# Patient Record
Sex: FEMALE | Race: ASIAN | URBAN - METROPOLITAN AREA
[De-identification: names, ages, dates, MRNs, and addresses within clinical notes are randomized per-mention and may not be internally consistent; named-entity substitution may affect disease eponyms.]

---

## 2016-04-20 RX ORDER — ONDANSETRON 8 MG/1
1 TABLET, FILM COATED ORAL
Qty: 15 | Refills: 0 | OUTPATIENT
Start: 2016-04-20 | End: 2017-08-28

## 2016-08-01 RX ORDER — OMEPRAZOLE 10 MG/1
1 CAPSULE, DELAYED RELEASE ORAL
Qty: 30 | Refills: 1 | OUTPATIENT
Start: 2016-08-01 | End: 2017-10-21

## 2017-08-18 ENCOUNTER — INPATIENT (INPATIENT)
Facility: HOSPITAL | Age: 22
LOS: 1 days | Discharge: HOME | DRG: 340 | End: 2017-08-20
Attending: HOSPITALIST | Admitting: HOSPITALIST
Payer: MEDICAID

## 2017-08-18 VITALS
WEIGHT: 260.15 LBS | OXYGEN SATURATION: 98 % | HEART RATE: 92 BPM | RESPIRATION RATE: 16 BRPM | HEIGHT: 62 IN | DIASTOLIC BLOOD PRESSURE: 88 MMHG | TEMPERATURE: 98 F | SYSTOLIC BLOOD PRESSURE: 125 MMHG

## 2017-08-18 DIAGNOSIS — Z29.9 ENCOUNTER FOR PROPHYLACTIC MEASURES, UNSPECIFIED: ICD-10-CM

## 2017-08-18 DIAGNOSIS — N83.20 UNSPECIFIED OVARIAN CYSTS: ICD-10-CM

## 2017-08-18 DIAGNOSIS — K29.70 GASTRITIS, UNSPECIFIED, WITHOUT BLEEDING: ICD-10-CM

## 2017-08-18 LAB
ALBUMIN SERPL ELPH-MCNC: 3.7 G/DL — SIGNIFICANT CHANGE UP (ref 3.5–5)
ALP SERPL-CCNC: 57 U/L — SIGNIFICANT CHANGE UP (ref 40–120)
ALT FLD-CCNC: 16 U/L DA — SIGNIFICANT CHANGE UP (ref 10–60)
ANION GAP SERPL CALC-SCNC: 9 MMOL/L — SIGNIFICANT CHANGE UP (ref 5–17)
AST SERPL-CCNC: 12 U/L — SIGNIFICANT CHANGE UP (ref 10–40)
BASOPHILS # BLD AUTO: 0 K/UL — SIGNIFICANT CHANGE UP (ref 0–0.2)
BASOPHILS NFR BLD AUTO: 0.4 % — SIGNIFICANT CHANGE UP (ref 0–2)
BILIRUB SERPL-MCNC: 0.7 MG/DL — SIGNIFICANT CHANGE UP (ref 0.2–1.2)
BUN SERPL-MCNC: 10 MG/DL — SIGNIFICANT CHANGE UP (ref 7–18)
CALCIUM SERPL-MCNC: 8.6 MG/DL — SIGNIFICANT CHANGE UP (ref 8.4–10.5)
CHLORIDE SERPL-SCNC: 107 MMOL/L — SIGNIFICANT CHANGE UP (ref 96–108)
CO2 SERPL-SCNC: 26 MMOL/L — SIGNIFICANT CHANGE UP (ref 22–31)
CREAT SERPL-MCNC: 0.72 MG/DL — SIGNIFICANT CHANGE UP (ref 0.5–1.3)
EOSINOPHIL # BLD AUTO: 0 K/UL — SIGNIFICANT CHANGE UP (ref 0–0.5)
EOSINOPHIL NFR BLD AUTO: 0.1 % — SIGNIFICANT CHANGE UP (ref 0–6)
GLUCOSE SERPL-MCNC: 104 MG/DL — HIGH (ref 70–99)
HCG UR QL: NEGATIVE — SIGNIFICANT CHANGE UP
HCT VFR BLD CALC: 40.1 % — SIGNIFICANT CHANGE UP (ref 34.5–45)
HGB BLD-MCNC: 13.1 G/DL — SIGNIFICANT CHANGE UP (ref 11.5–15.5)
LIDOCAIN IGE QN: 135 U/L — SIGNIFICANT CHANGE UP (ref 73–393)
LYMPHOCYTES # BLD AUTO: 0.8 K/UL — LOW (ref 1–3.3)
LYMPHOCYTES # BLD AUTO: 8.1 % — LOW (ref 13–44)
MCHC RBC-ENTMCNC: 29 PG — SIGNIFICANT CHANGE UP (ref 27–34)
MCHC RBC-ENTMCNC: 32.7 GM/DL — SIGNIFICANT CHANGE UP (ref 32–36)
MCV RBC AUTO: 88.6 FL — SIGNIFICANT CHANGE UP (ref 80–100)
MONOCYTES # BLD AUTO: 0.3 K/UL — SIGNIFICANT CHANGE UP (ref 0–0.9)
MONOCYTES NFR BLD AUTO: 2.9 % — SIGNIFICANT CHANGE UP (ref 2–14)
NEUTROPHILS # BLD AUTO: 8.4 K/UL — HIGH (ref 1.8–7.4)
NEUTROPHILS NFR BLD AUTO: 88.6 % — HIGH (ref 43–77)
PLATELET # BLD AUTO: 174 K/UL — SIGNIFICANT CHANGE UP (ref 150–400)
POTASSIUM SERPL-MCNC: 3.5 MMOL/L — SIGNIFICANT CHANGE UP (ref 3.5–5.3)
POTASSIUM SERPL-SCNC: 3.5 MMOL/L — SIGNIFICANT CHANGE UP (ref 3.5–5.3)
PROT SERPL-MCNC: 7.7 G/DL — SIGNIFICANT CHANGE UP (ref 6–8.3)
RBC # BLD: 4.52 M/UL — SIGNIFICANT CHANGE UP (ref 3.8–5.2)
RBC # FLD: 13.1 % — SIGNIFICANT CHANGE UP (ref 10.3–14.5)
SODIUM SERPL-SCNC: 142 MMOL/L — SIGNIFICANT CHANGE UP (ref 135–145)
WBC # BLD: 9.5 K/UL — SIGNIFICANT CHANGE UP (ref 3.8–10.5)
WBC # FLD AUTO: 9.5 K/UL — SIGNIFICANT CHANGE UP (ref 3.8–10.5)

## 2017-08-18 PROCEDURE — 76705 ECHO EXAM OF ABDOMEN: CPT | Mod: 26

## 2017-08-18 PROCEDURE — 99285 EMERGENCY DEPT VISIT HI MDM: CPT

## 2017-08-18 RX ORDER — SODIUM CHLORIDE 9 MG/ML
1000 INJECTION INTRAMUSCULAR; INTRAVENOUS; SUBCUTANEOUS ONCE
Qty: 0 | Refills: 0 | Status: COMPLETED | OUTPATIENT
Start: 2017-08-18 | End: 2017-08-18

## 2017-08-18 RX ORDER — ONDANSETRON 8 MG/1
4 TABLET, FILM COATED ORAL EVERY 4 HOURS
Qty: 0 | Refills: 0 | Status: DISCONTINUED | OUTPATIENT
Start: 2017-08-18 | End: 2017-08-19

## 2017-08-18 RX ORDER — FAMOTIDINE 10 MG/ML
20 INJECTION INTRAVENOUS ONCE
Qty: 0 | Refills: 0 | Status: COMPLETED | OUTPATIENT
Start: 2017-08-18 | End: 2017-08-18

## 2017-08-18 RX ORDER — PANTOPRAZOLE SODIUM 20 MG/1
40 TABLET, DELAYED RELEASE ORAL
Qty: 0 | Refills: 0 | Status: DISCONTINUED | OUTPATIENT
Start: 2017-08-18 | End: 2017-08-19

## 2017-08-18 RX ORDER — ACETAMINOPHEN 500 MG
650 TABLET ORAL ONCE
Qty: 0 | Refills: 0 | Status: COMPLETED | OUTPATIENT
Start: 2017-08-18 | End: 2017-08-18

## 2017-08-18 RX ORDER — SUCRALFATE 1 G
1 TABLET ORAL ONCE
Qty: 0 | Refills: 0 | Status: COMPLETED | OUTPATIENT
Start: 2017-08-18 | End: 2017-08-18

## 2017-08-18 RX ORDER — PANTOPRAZOLE SODIUM 20 MG/1
40 TABLET, DELAYED RELEASE ORAL ONCE
Qty: 0 | Refills: 0 | Status: COMPLETED | OUTPATIENT
Start: 2017-08-18 | End: 2017-08-18

## 2017-08-18 RX ORDER — ONDANSETRON 8 MG/1
4 TABLET, FILM COATED ORAL ONCE
Qty: 0 | Refills: 0 | Status: COMPLETED | OUTPATIENT
Start: 2017-08-18 | End: 2017-08-18

## 2017-08-18 RX ORDER — METOCLOPRAMIDE HCL 10 MG
10 TABLET ORAL ONCE
Qty: 0 | Refills: 0 | Status: COMPLETED | OUTPATIENT
Start: 2017-08-18 | End: 2017-08-18

## 2017-08-18 RX ORDER — SODIUM CHLORIDE 9 MG/ML
1000 INJECTION INTRAMUSCULAR; INTRAVENOUS; SUBCUTANEOUS
Qty: 0 | Refills: 0 | Status: DISCONTINUED | OUTPATIENT
Start: 2017-08-18 | End: 2017-08-19

## 2017-08-18 RX ADMIN — Medication 650 MILLIGRAM(S): at 08:30

## 2017-08-18 RX ADMIN — PANTOPRAZOLE SODIUM 40 MILLIGRAM(S): 20 TABLET, DELAYED RELEASE ORAL at 09:41

## 2017-08-18 RX ADMIN — Medication 10 MILLIGRAM(S): at 09:41

## 2017-08-18 RX ADMIN — SODIUM CHLORIDE 150 MILLILITER(S): 9 INJECTION INTRAMUSCULAR; INTRAVENOUS; SUBCUTANEOUS at 22:49

## 2017-08-18 RX ADMIN — ONDANSETRON 4 MILLIGRAM(S): 8 TABLET, FILM COATED ORAL at 08:52

## 2017-08-18 RX ADMIN — Medication 30 MILLILITER(S): at 08:51

## 2017-08-18 RX ADMIN — SODIUM CHLORIDE 2000 MILLILITER(S): 9 INJECTION INTRAMUSCULAR; INTRAVENOUS; SUBCUTANEOUS at 08:28

## 2017-08-18 RX ADMIN — FAMOTIDINE 20 MILLIGRAM(S): 10 INJECTION INTRAVENOUS at 08:51

## 2017-08-18 RX ADMIN — Medication 650 MILLIGRAM(S): at 08:51

## 2017-08-18 RX ADMIN — ONDANSETRON 4 MILLIGRAM(S): 8 TABLET, FILM COATED ORAL at 09:41

## 2017-08-18 RX ADMIN — Medication 204 MILLIGRAM(S): at 11:22

## 2017-08-18 RX ADMIN — Medication 1 GRAM(S): at 10:10

## 2017-08-18 RX ADMIN — ONDANSETRON 4 MILLIGRAM(S): 8 TABLET, FILM COATED ORAL at 13:50

## 2017-08-18 NOTE — H&P ADULT - MUSCULOSKELETAL
detailed exam normal strength/no joint erythema/no calf tenderness/ROM intact/no joint swelling/no joint warmth/normal

## 2017-08-18 NOTE — ED PROVIDER NOTE - MEDICAL DECISION MAKING DETAILS
21 y/o F pt w/ likely recurrent gastritis. Will treat for gastritis, check labs, likely outpatient f/u.

## 2017-08-18 NOTE — ED PROVIDER NOTE - OBJECTIVE STATEMENT
23 y/o F pt w/ PMHx of gastritis and PUD (treated w/ resolution of symptoms 1 year ago) presents to ED c/o recurrence of symptoms since yesterday. Pt reports similar burning epigastric pain radiating up into her chest. Pt states that she took Tylenol without relief. Pt reports that progressive worsening of symptoms yesterday until earlier this morning when she began having nausea and vomiting x2 (NBNB). Pt denies fever, chills, chest pain, cough, SOB, diarrhea, dysuria, hematuria, or any other complaints. Pt reports no recent illness. Pt is currently taking no medications. Pt is allergic to Penicillin (Anaphylaxis).

## 2017-08-18 NOTE — H&P ADULT - ASSESSMENT
In the ED vitals signs stable, labs good, U/s shows no cholecystitis , cholelithiasis, got 1 L NS bolus, Tylenol Maalox Pepcid raglan Zofran Protonix promethazine sucralfate, CT abdomen ordered. Admitted to medicine floor for likely gastritis.

## 2017-08-18 NOTE — H&P ADULT - HISTORY OF PRESENT ILLNESS
23 Y/O F, PMhx of gastritis and PUD in last year ( treated with meds), lives with family, came for many episodes of vomiting nausea, heart burn, abdominal pain. As per the patient she started having vomiting today at about 4 :00 am. Initially vomiting was comtaing food particles, later it was watery with green colour, no blood. Uptio now she has many episodes of vomiting. Associated she has cramping periumbilical abdominal pain, 5/10. She aslo endorses having the loose stools about 3 episodes yesterday she ate at [employer] , but it is ot new for her. She has similar kind of episode about last year in Feb, seen by gastroenterologist Dr Valentine , given the treatment with some meds for 10 days, diagnosed with ulcer, then she used the omeprazole for about 3 months wit resolution in her symptoms. No f/up afterwards. She denies any headache weight loss, urinary complains, blood in stool,  no recent use of antibiotics. 21 Y/O F, PMhx of gastritis and PUD in last year ( treated with meds), lives with family, came for many episodes of vomiting nausea, heart burn, abdominal pain. As per the patient she started having vomiting today at about 4 :00 am. Initially vomiting was comtaing food particles, later it was watery with green colour, no blood. Upto now she has many episodes of vomiting. Associated she has cramping periumbilical abdominal pain, 5/10. She aslo endorses having the loose stools about 3 episodes yesterday she ate at [employer] , but it is ot new for her. She has similar kind of episode about last year in Feb, seen by gastroenterologist Dr Valentine , given the treatment with some meds for 10 days, diagnosed with ulcer, then she used the omeprazole for about 3 months wit resolution in her symptoms. No f/up afterwards. She denies any headache weight loss, urinary complains, blood in stool,  no recent use of antibiotics.     Last LMP this august, she currently having her periods 21 Y/O F, PMhx of gastritis and PUD in last year ( treated with meds), lives with family, came for many episodes of vomiting nausea, heart burn, abdominal pain. As per the patient she started having vomiting today at about 4 :00 am. Initially vomiting was containing food particles, later it was watery with green colour, no blood. Upto now she has many episodes of vomiting. Associated she has cramping periumbilical abdominal pain, 5/10. She aslo endorses having the loose stools about 3 episodes yesterday she ate at [employer] , but it is not new for her. She has similar kind of episode about last year in Feb, seen by gastroenterologist Dr Valentine , given the treatment with some meds for 10 days, diagnosed with ulcer, then she used the omeprazole for about 3 months wit resolution in her symptoms. No f/up afterwards. She denies any headache weight loss, urinary complains, blood in stool,  no recent use of antibiotics.     Last LMP this august, she currently having her periods

## 2017-08-18 NOTE — H&P ADULT - PROBLEM SELECTOR PLAN 3
Imporve score is 0, no need for DVT prophylaxis, encourage the ambulation. Improve score is 0, no need for DVT prophylaxis, encourage the ambulation.

## 2017-08-18 NOTE — ED PROVIDER NOTE - PROGRESS NOTE DETAILS
Persistent N/V x4 episodes despite multiple anti-emetics, will admit for IV hydration and further eval.

## 2017-08-18 NOTE — ED ADULT NURSE NOTE - OBJECTIVE STATEMENT
Patient is here for stomach pain since yesterday.  Patient states that she vomited in the morning with very bed pain.  Denied vomiting at the time but pain 10/10.  Maintained calm behavior at the bedside with family member.

## 2017-08-18 NOTE — H&P ADULT - NSHPSOCIALHISTORY_GEN_ALL_CORE
Non-smoker, occasional alcohol intake, no substance absue Non-smoker, occasional alcohol intake, no substance abuse

## 2017-08-18 NOTE — H&P ADULT - ATTENDING COMMENTS
Patient seen and examined at bedside yesterday, CT abdomen was to be done to rule out appendicitis as symptoms coinciding with.  Patient had pete-umbilical pain associated with nausea and vomiting.  Physical exam: pain at the right lower quadrant.  A/P  CT abdomen to rule out appendicitis  antiemetics

## 2017-08-18 NOTE — H&P ADULT - PROBLEM SELECTOR PLAN 1
Likely gastritis, given previous history of ulcer as  per family   s/p  Maalox Pepcid raglan Zofran Protonix promethazine sucralfate  No alarm findings   Also has pete umblical tenderness   Will do the CT scan   Gentle hydration for 24 hours, clears liquid diet   C/w Protonix BID, Zofran  If symptoms persistent needs EGD   GI consult Likely gastritis, given previous history of ulcer as per family   s/p  Maalox Pepcid raglan Zofran Protonix promethazine sucralfate  No alarm findings   Also has pete umbilical tenderness   Will do the CT scan of abdomen and pelvis   Gentle hydration for 24 hours, clears liquid diet   C/w Protonix BID, Zofran, maalox   If symptoms persistent needs EGD   GI consult Likely gastritis, given previous history of ulcer as per family   s/p  Maalox Pepcid raglan Zofran Protonix promethazine sucralfate  No alarm findings   Also has pete umbilical tenderness   Will do the CT scan of abdomen and pelvis   Gentle hydration for 24 hours, clears liquid diet   C/w Protonix BID, Zofran, maalox   If symptoms persistent needs EGD

## 2017-08-18 NOTE — ED ADULT TRIAGE NOTE - CHIEF COMPLAINT QUOTE
as per the pt " I have the epigastric pain from yesterday and vomiting " pt has the h/o peptic ulcer disease

## 2017-08-18 NOTE — H&P ADULT - NSHPLABSRESULTS_GEN_ALL_CORE
13.1   9.5   )-----------( 174      ( 18 Aug 2017 08:42 )             40.1   08-18    142  |  107  |  10  ----------------------------<  104<H>  3.5   |  26  |  0.72    Ca    8.6      18 Aug 2017 08:42    TPro  7.7  /  Alb  3.7  /  TBili  0.7  /  DBili  x   /  AST  12  /  ALT  16  /  AlkPhos  57  08-18 13.1   9.5   )-----------( 174      ( 18 Aug 2017 08:42 )             40.1   08-18    142  |  107  |  10  ----------------------------<  104<H>  3.5   |  26  |  0.72    Ca    8.6      18 Aug 2017 08:42    TPro  7.7  /  Alb  3.7  /  TBili  0.7  /  DBili  x   /  AST  12  /  ALT  16  /  AlkPhos  57  08-18     US Hepatic & Pancreatic (08.18.17 @ 13:31) >  No evidence for cholelithiasis or acute cholecystitis.

## 2017-08-19 ENCOUNTER — RESULT REVIEW (OUTPATIENT)
Age: 22
End: 2017-08-19

## 2017-08-19 DIAGNOSIS — K35.80 UNSPECIFIED ACUTE APPENDICITIS: ICD-10-CM

## 2017-08-19 DIAGNOSIS — K29.00 ACUTE GASTRITIS WITHOUT BLEEDING: ICD-10-CM

## 2017-08-19 LAB
ALBUMIN SERPL ELPH-MCNC: 3.2 G/DL — LOW (ref 3.5–5)
ALP SERPL-CCNC: 50 U/L — SIGNIFICANT CHANGE UP (ref 40–120)
ALT FLD-CCNC: 14 U/L DA — SIGNIFICANT CHANGE UP (ref 10–60)
ANION GAP SERPL CALC-SCNC: 8 MMOL/L — SIGNIFICANT CHANGE UP (ref 5–17)
AST SERPL-CCNC: 12 U/L — SIGNIFICANT CHANGE UP (ref 10–40)
BASOPHILS # BLD AUTO: 0 K/UL — SIGNIFICANT CHANGE UP (ref 0–0.2)
BASOPHILS NFR BLD AUTO: 0.2 % — SIGNIFICANT CHANGE UP (ref 0–2)
BILIRUB SERPL-MCNC: 1.3 MG/DL — HIGH (ref 0.2–1.2)
BUN SERPL-MCNC: 6 MG/DL — LOW (ref 7–18)
CALCIUM SERPL-MCNC: 8.3 MG/DL — LOW (ref 8.4–10.5)
CHLORIDE SERPL-SCNC: 108 MMOL/L — SIGNIFICANT CHANGE UP (ref 96–108)
CHOLEST SERPL-MCNC: 164 MG/DL — SIGNIFICANT CHANGE UP (ref 10–199)
CO2 SERPL-SCNC: 25 MMOL/L — SIGNIFICANT CHANGE UP (ref 22–31)
CREAT SERPL-MCNC: 0.65 MG/DL — SIGNIFICANT CHANGE UP (ref 0.5–1.3)
EOSINOPHIL # BLD AUTO: 0 K/UL — SIGNIFICANT CHANGE UP (ref 0–0.5)
EOSINOPHIL NFR BLD AUTO: 0.1 % — SIGNIFICANT CHANGE UP (ref 0–6)
FOLATE SERPL-MCNC: 6.2 NG/ML — SIGNIFICANT CHANGE UP (ref 4.8–24.2)
GLUCOSE SERPL-MCNC: 85 MG/DL — SIGNIFICANT CHANGE UP (ref 70–99)
HBA1C BLD-MCNC: 5 % — SIGNIFICANT CHANGE UP (ref 4–5.6)
HCT VFR BLD CALC: 35.8 % — SIGNIFICANT CHANGE UP (ref 34.5–45)
HDLC SERPL-MCNC: 51 MG/DL — SIGNIFICANT CHANGE UP (ref 40–125)
HGB BLD-MCNC: 11.8 G/DL — SIGNIFICANT CHANGE UP (ref 11.5–15.5)
INR BLD: 1.32 RATIO — HIGH (ref 0.88–1.16)
LIPID PNL WITH DIRECT LDL SERPL: 99 MG/DL — SIGNIFICANT CHANGE UP
LYMPHOCYTES # BLD AUTO: 0.5 K/UL — LOW (ref 1–3.3)
LYMPHOCYTES # BLD AUTO: 4.6 % — LOW (ref 13–44)
MAGNESIUM SERPL-MCNC: 1.9 MG/DL — SIGNIFICANT CHANGE UP (ref 1.6–2.6)
MCHC RBC-ENTMCNC: 29.2 PG — SIGNIFICANT CHANGE UP (ref 27–34)
MCHC RBC-ENTMCNC: 33 GM/DL — SIGNIFICANT CHANGE UP (ref 32–36)
MCV RBC AUTO: 88.5 FL — SIGNIFICANT CHANGE UP (ref 80–100)
MONOCYTES # BLD AUTO: 0.1 K/UL — SIGNIFICANT CHANGE UP (ref 0–0.9)
MONOCYTES NFR BLD AUTO: 0.7 % — LOW (ref 2–14)
NEUTROPHILS # BLD AUTO: 9.8 K/UL — HIGH (ref 1.8–7.4)
NEUTROPHILS NFR BLD AUTO: 94.4 % — HIGH (ref 43–77)
PHOSPHATE SERPL-MCNC: 2.5 MG/DL — SIGNIFICANT CHANGE UP (ref 2.5–4.5)
PLATELET # BLD AUTO: 160 K/UL — SIGNIFICANT CHANGE UP (ref 150–400)
POTASSIUM SERPL-MCNC: 3.3 MMOL/L — LOW (ref 3.5–5.3)
POTASSIUM SERPL-SCNC: 3.3 MMOL/L — LOW (ref 3.5–5.3)
PROT SERPL-MCNC: 6.7 G/DL — SIGNIFICANT CHANGE UP (ref 6–8.3)
PROTHROM AB SERPL-ACNC: 14.5 SEC — HIGH (ref 9.8–12.7)
RBC # BLD: 4.05 M/UL — SIGNIFICANT CHANGE UP (ref 3.8–5.2)
RBC # FLD: 12.9 % — SIGNIFICANT CHANGE UP (ref 10.3–14.5)
SODIUM SERPL-SCNC: 141 MMOL/L — SIGNIFICANT CHANGE UP (ref 135–145)
TOTAL CHOLESTEROL/HDL RATIO MEASUREMENT: 3.2 RATIO — LOW (ref 3.3–7.1)
TRIGL SERPL-MCNC: 71 MG/DL — SIGNIFICANT CHANGE UP (ref 10–149)
TSH SERPL-MCNC: 0.46 UU/ML — SIGNIFICANT CHANGE UP (ref 0.34–4.82)
VIT B12 SERPL-MCNC: 559 PG/ML — SIGNIFICANT CHANGE UP (ref 243–894)
WBC # BLD: 10.4 K/UL — SIGNIFICANT CHANGE UP (ref 3.8–10.5)
WBC # FLD AUTO: 10.4 K/UL — SIGNIFICANT CHANGE UP (ref 3.8–10.5)

## 2017-08-19 PROCEDURE — 88304 TISSUE EXAM BY PATHOLOGIST: CPT | Mod: 26

## 2017-08-19 PROCEDURE — 74177 CT ABD & PELVIS W/CONTRAST: CPT | Mod: 26

## 2017-08-19 RX ORDER — OXYCODONE AND ACETAMINOPHEN 5; 325 MG/1; MG/1
1 TABLET ORAL EVERY 4 HOURS
Qty: 0 | Refills: 0 | Status: DISCONTINUED | OUTPATIENT
Start: 2017-08-19 | End: 2017-08-20

## 2017-08-19 RX ORDER — METRONIDAZOLE 500 MG
500 TABLET ORAL EVERY 8 HOURS
Qty: 0 | Refills: 0 | Status: DISCONTINUED | OUTPATIENT
Start: 2017-08-19 | End: 2017-08-20

## 2017-08-19 RX ORDER — ONDANSETRON 8 MG/1
4 TABLET, FILM COATED ORAL EVERY 6 HOURS
Qty: 0 | Refills: 0 | Status: DISCONTINUED | OUTPATIENT
Start: 2017-08-19 | End: 2017-08-20

## 2017-08-19 RX ORDER — HYDROMORPHONE HYDROCHLORIDE 2 MG/ML
0.5 INJECTION INTRAMUSCULAR; INTRAVENOUS; SUBCUTANEOUS
Qty: 0 | Refills: 0 | Status: DISCONTINUED | OUTPATIENT
Start: 2017-08-19 | End: 2017-08-19

## 2017-08-19 RX ORDER — ACETAMINOPHEN 500 MG
1000 TABLET ORAL ONCE
Qty: 0 | Refills: 0 | Status: COMPLETED | OUTPATIENT
Start: 2017-08-19 | End: 2017-08-19

## 2017-08-19 RX ORDER — ACETAMINOPHEN 500 MG
650 TABLET ORAL EVERY 6 HOURS
Qty: 0 | Refills: 0 | Status: DISCONTINUED | OUTPATIENT
Start: 2017-08-19 | End: 2017-08-20

## 2017-08-19 RX ORDER — METRONIDAZOLE 500 MG
TABLET ORAL
Qty: 0 | Refills: 0 | Status: DISCONTINUED | OUTPATIENT
Start: 2017-08-19 | End: 2017-08-19

## 2017-08-19 RX ORDER — PANTOPRAZOLE SODIUM 20 MG/1
40 TABLET, DELAYED RELEASE ORAL
Qty: 0 | Refills: 0 | Status: DISCONTINUED | OUTPATIENT
Start: 2017-08-19 | End: 2017-08-19

## 2017-08-19 RX ORDER — HEPARIN SODIUM 5000 [USP'U]/ML
5000 INJECTION INTRAVENOUS; SUBCUTANEOUS EVERY 8 HOURS
Qty: 0 | Refills: 0 | Status: DISCONTINUED | OUTPATIENT
Start: 2017-08-19 | End: 2017-08-20

## 2017-08-19 RX ORDER — SODIUM CHLORIDE 9 MG/ML
1000 INJECTION INTRAMUSCULAR; INTRAVENOUS; SUBCUTANEOUS
Qty: 0 | Refills: 0 | Status: DISCONTINUED | OUTPATIENT
Start: 2017-08-19 | End: 2017-08-20

## 2017-08-19 RX ORDER — METRONIDAZOLE 500 MG
500 TABLET ORAL EVERY 8 HOURS
Qty: 0 | Refills: 0 | Status: DISCONTINUED | OUTPATIENT
Start: 2017-08-19 | End: 2017-08-19

## 2017-08-19 RX ORDER — SODIUM CHLORIDE 9 MG/ML
1000 INJECTION, SOLUTION INTRAVENOUS
Qty: 0 | Refills: 0 | Status: DISCONTINUED | OUTPATIENT
Start: 2017-08-19 | End: 2017-08-19

## 2017-08-19 RX ORDER — CIPROFLOXACIN LACTATE 400MG/40ML
400 VIAL (ML) INTRAVENOUS EVERY 12 HOURS
Qty: 0 | Refills: 0 | Status: DISCONTINUED | OUTPATIENT
Start: 2017-08-19 | End: 2017-08-20

## 2017-08-19 RX ORDER — CIPROFLOXACIN LACTATE 400MG/40ML
400 VIAL (ML) INTRAVENOUS ONCE
Qty: 0 | Refills: 0 | Status: DISCONTINUED | OUTPATIENT
Start: 2017-08-19 | End: 2017-08-19

## 2017-08-19 RX ORDER — CEFTRIAXONE 500 MG/1
INJECTION, POWDER, FOR SOLUTION INTRAMUSCULAR; INTRAVENOUS
Qty: 0 | Refills: 0 | Status: DISCONTINUED | OUTPATIENT
Start: 2017-08-19 | End: 2017-08-19

## 2017-08-19 RX ORDER — SODIUM CHLORIDE 9 MG/ML
1000 INJECTION INTRAMUSCULAR; INTRAVENOUS; SUBCUTANEOUS
Qty: 0 | Refills: 0 | Status: DISCONTINUED | OUTPATIENT
Start: 2017-08-19 | End: 2017-08-19

## 2017-08-19 RX ORDER — MORPHINE SULFATE 50 MG/1
2 CAPSULE, EXTENDED RELEASE ORAL EVERY 4 HOURS
Qty: 0 | Refills: 0 | Status: DISCONTINUED | OUTPATIENT
Start: 2017-08-19 | End: 2017-08-19

## 2017-08-19 RX ORDER — POTASSIUM CHLORIDE 20 MEQ
10 PACKET (EA) ORAL
Qty: 0 | Refills: 0 | Status: COMPLETED | OUTPATIENT
Start: 2017-08-19 | End: 2017-08-19

## 2017-08-19 RX ORDER — METRONIDAZOLE 500 MG
500 TABLET ORAL ONCE
Qty: 0 | Refills: 0 | Status: COMPLETED | OUTPATIENT
Start: 2017-08-19 | End: 2017-08-19

## 2017-08-19 RX ORDER — MORPHINE SULFATE 50 MG/1
2 CAPSULE, EXTENDED RELEASE ORAL EVERY 6 HOURS
Qty: 0 | Refills: 0 | Status: DISCONTINUED | OUTPATIENT
Start: 2017-08-19 | End: 2017-08-20

## 2017-08-19 RX ORDER — HYDROMORPHONE HYDROCHLORIDE 2 MG/ML
0.5 INJECTION INTRAMUSCULAR; INTRAVENOUS; SUBCUTANEOUS ONCE
Qty: 0 | Refills: 0 | Status: DISCONTINUED | OUTPATIENT
Start: 2017-08-19 | End: 2017-08-19

## 2017-08-19 RX ORDER — FAMOTIDINE 10 MG/ML
20 INJECTION INTRAVENOUS ONCE
Qty: 0 | Refills: 0 | Status: DISCONTINUED | OUTPATIENT
Start: 2017-08-19 | End: 2017-08-19

## 2017-08-19 RX ORDER — CEFTRIAXONE 500 MG/1
1 INJECTION, POWDER, FOR SOLUTION INTRAMUSCULAR; INTRAVENOUS EVERY 24 HOURS
Qty: 0 | Refills: 0 | Status: CANCELLED | OUTPATIENT
Start: 2017-08-20 | End: 2017-08-19

## 2017-08-19 RX ORDER — CEFTRIAXONE 500 MG/1
1 INJECTION, POWDER, FOR SOLUTION INTRAMUSCULAR; INTRAVENOUS ONCE
Qty: 0 | Refills: 0 | Status: COMPLETED | OUTPATIENT
Start: 2017-08-19 | End: 2017-08-19

## 2017-08-19 RX ORDER — CIPROFLOXACIN LACTATE 400MG/40ML
VIAL (ML) INTRAVENOUS
Qty: 0 | Refills: 0 | Status: DISCONTINUED | OUTPATIENT
Start: 2017-08-19 | End: 2017-08-19

## 2017-08-19 RX ORDER — ONDANSETRON 8 MG/1
4 TABLET, FILM COATED ORAL ONCE
Qty: 0 | Refills: 0 | Status: COMPLETED | OUTPATIENT
Start: 2017-08-19 | End: 2017-08-19

## 2017-08-19 RX ORDER — POTASSIUM CHLORIDE 20 MEQ
40 PACKET (EA) ORAL EVERY 4 HOURS
Qty: 0 | Refills: 0 | Status: COMPLETED | OUTPATIENT
Start: 2017-08-19 | End: 2017-08-19

## 2017-08-19 RX ORDER — ONDANSETRON 8 MG/1
4 TABLET, FILM COATED ORAL ONCE
Qty: 0 | Refills: 0 | Status: DISCONTINUED | OUTPATIENT
Start: 2017-08-19 | End: 2017-08-19

## 2017-08-19 RX ORDER — FAMOTIDINE 10 MG/ML
20 INJECTION INTRAVENOUS
Qty: 0 | Refills: 0 | Status: DISCONTINUED | OUTPATIENT
Start: 2017-08-19 | End: 2017-08-19

## 2017-08-19 RX ORDER — FENTANYL CITRATE 50 UG/ML
25 INJECTION INTRAVENOUS
Qty: 0 | Refills: 0 | Status: DISCONTINUED | OUTPATIENT
Start: 2017-08-19 | End: 2017-08-19

## 2017-08-19 RX ADMIN — ONDANSETRON 4 MILLIGRAM(S): 8 TABLET, FILM COATED ORAL at 06:09

## 2017-08-19 RX ADMIN — Medication 400 MILLIGRAM(S): at 18:17

## 2017-08-19 RX ADMIN — CEFTRIAXONE 100 GRAM(S): 500 INJECTION, POWDER, FOR SOLUTION INTRAMUSCULAR; INTRAVENOUS at 14:45

## 2017-08-19 RX ADMIN — PANTOPRAZOLE SODIUM 40 MILLIGRAM(S): 20 TABLET, DELAYED RELEASE ORAL at 06:08

## 2017-08-19 RX ADMIN — Medication 100 MILLIGRAM(S): at 13:30

## 2017-08-19 RX ADMIN — MORPHINE SULFATE 2 MILLIGRAM(S): 50 CAPSULE, EXTENDED RELEASE ORAL at 20:00

## 2017-08-19 RX ADMIN — Medication 100 MILLIGRAM(S): at 21:30

## 2017-08-19 RX ADMIN — Medication 100 MILLIEQUIVALENT(S): at 13:30

## 2017-08-19 RX ADMIN — HEPARIN SODIUM 5000 UNIT(S): 5000 INJECTION INTRAVENOUS; SUBCUTANEOUS at 21:30

## 2017-08-19 RX ADMIN — MORPHINE SULFATE 2 MILLIGRAM(S): 50 CAPSULE, EXTENDED RELEASE ORAL at 19:44

## 2017-08-19 RX ADMIN — Medication 1000 MILLIGRAM(S): at 18:37

## 2017-08-19 RX ADMIN — HYDROMORPHONE HYDROCHLORIDE 0.5 MILLIGRAM(S): 2 INJECTION INTRAMUSCULAR; INTRAVENOUS; SUBCUTANEOUS at 10:00

## 2017-08-19 RX ADMIN — Medication 200 MILLIGRAM(S): at 21:25

## 2017-08-19 RX ADMIN — HYDROMORPHONE HYDROCHLORIDE 0.5 MILLIGRAM(S): 2 INJECTION INTRAMUSCULAR; INTRAVENOUS; SUBCUTANEOUS at 09:29

## 2017-08-19 NOTE — PROGRESS NOTE ADULT - ASSESSMENT
HPI:  23 Y/O F, PMhx of gastritis and PUD in last year ( treated with meds), lives with family, came for many episodes of vomiting nausea, heart burn, abdominal pain. As per the patient she started having vomiting today at about 4 :00 am. Initially vomiting was containing food particles, later it was watery with green colour, no blood. Upto now she has many episodes of vomiting. Associated she has cramping periumbilical abdominal pain, 5/10. She aslo endorses having the loose stools about 3 episodes yesterday she ate at [employer] , but it is not new for her. She has similar kind of episode about last year in Feb, seen by gastroenterologist Dr Valentine , given the treatment with some meds for 10 days, diagnosed with ulcer, then she used the omeprazole for about 3 months wit resolution in her symptoms. No f/up afterwards. She denies any headache weight loss, urinary complains, blood in stool,  no recent use of antibiotics.     Last LMP this august, she currently having her periods (18 Aug 2017 18:47)

## 2017-08-19 NOTE — PROGRESS NOTE ADULT - ASSESSMENT
23yo F presented to ED with multiple episode of nausea and vomiting. In ed she complained of pete umbilical abdominal pain.

## 2017-08-19 NOTE — PROGRESS NOTE ADULT - SUBJECTIVE AND OBJECTIVE BOX
Patient is a 22y old  Female who presents with a chief complaint of vomiting, heart burn (18 Aug 2017 18:47)      INTERVAL HPI/OVERNIGHT EVENTS:  Patient seen and examined at bedside, Vomiting stopped,   Medical staff reporting patient was refusing to take oral contrast yesterday hence CT was delayed.  CT abdomen pertinent for acute appendicitis as well as duodenitis and stomach distension.   Surgery consult requested and patient will be taken to the OR today for lap appendicectomy with possible open surgery.    Allergies    peanuts (Anaphylaxis; Angioedema)  penicillin (Pruritus; Hives)    Intolerances    REVIEW OF SYSTEMS:  CONSTITUTIONAL: No fever, weight loss, or fatigue  EYES: No eye pain, visual disturbances, or discharge  RESPIRATORY: No cough, wheezing or shortness of breath  CARDIOVASCULAR: No chest pain, feeling of heart beats  GASTROINTESTINAL: No abdominal or epigastric pain. No nausea, vomiting; No diarrhea or constipation.  GENITOURINARY: No dysuria, frequency, hematuria  NEUROLOGICAL: No headaches  MUSCULOSKELETAL: No joint pain  PSYCHIATRIC: No depression or anxiety  HEME/LYMPH: No easy bruising, or bleeding gums  ALLERGY AND IMMUNOLOGIC: No hives or eczema    Medications:  aluminum hydroxide/magnesium hydroxide/simethicone Suspension 30 milliLiter(s) Oral every 4 hours PRN Dyspepsia  ondansetron Injectable 4 milliGRAM(s) IV Push every 4 hours PRN Nausea and/or Vomiting  sodium chloride 0.9%. 1000 milliLiter(s) IV Continuous <Continuous>  potassium chloride    Tablet ER 40 milliEquivalent(s) Oral every 4 hours  pantoprazole    Tablet 40 milliGRAM(s) Oral two times a day before meals  famotidine    Tablet 20 milliGRAM(s) Oral once PRN If protonix was not given/didn't help  potassium chloride  10 mEq/100 mL IVPB 10 milliEquivalent(s) IV Intermittent every 1 hour  metro NIDAZOLE  IVPB   IV Intermittent   metro NIDAZOLE  IVPB 500 milliGRAM(s) IV Intermittent every 8 hours  cefTRIAXone   IVPB 1 Gram(s) IV Intermittent once  HYDROmorphone  Injectable 0.5 milliGRAM(s) IV Push four times a day PRN Severe Pain (7 - 10)  cefTRIAXone   IVPB   IV Intermittent       PHYSICAL EXAM:  Vital Signs Last 24 Hrs  T(C): 37.7 (19 Aug 2017 14:27), Max: 38 (19 Aug 2017 11:08)  T(F): 99.8 (19 Aug 2017 14:27), Max: 100.4 (19 Aug 2017 11:08)  HR: 119 (19 Aug 2017 15:20) (59 - 127)  BP: 109/72 (19 Aug 2017 15:20) (92/57 - 118/85)  BP(mean): --  RR: 16 (19 Aug 2017 14:27) (14 - 20)  SpO2: 100% (19 Aug 2017 14:27) (98% - 100%)    GENERAL: NAD  HEAD:  Atraumatic, Normocephalic  EYES: EOMI, PERRLA, conjunctiva and sclera clear  ENT: moist mucous membranes  NECK: Supple, No JVD, Normal thyroid  NERVOUS SYSTEM:  Alert & Oriented X3, Good concentration; Motor Strength 5/5 B/L upper and lower extremities; DTRs 2+ intact and symmetric  CHEST/LUNG: No rales, rhonchi, wheezing, or rubs  HEART: Regular rate and rhythm; No murmurs, rubs, or gallops  ABDOMEN: pain at the right lower quadrant.  EXTREMITIES:  2+ Peripheral Pulses, No clubbing, cyanosis, or edema  SKIN: No rashes or lesions    LABS:                        11.8   10.4  )-----------( 160      ( 19 Aug 2017 07:04 )             35.8     08-19    141  |  108  |  6<L>  ----------------------------<  85  3.3<L>   |  25  |  0.65    Ca    8.3<L>      19 Aug 2017 07:04  Phos  2.5     08-19  Mg     1.9     08-19    TPro  6.7  /  Alb  3.2<L>  /  TBili  1.3<H>  /  DBili  x   /  AST  12  /  ALT  14  /  AlkPhos  50  08-19    LIVER FUNCTIONS - ( 19 Aug 2017 07:04 )  Alb: 3.2 g/dL / Pro: 6.7 g/dL / ALK PHOS: 50 U/L / ALT: 14 U/L DA / AST: 12 U/L / GGT: x             PT/INR - ( 19 Aug 2017 14:24 )   PT: 14.5 sec;   INR: 1.32 ratio      Culture & Sensitivity:   CAPILLARY BLOOD GLUCOSE    08-18 @ 07:01  -  08-19 @ 07:00  --------------------------------------------------------  IN: 1800 mL / OUT: 0 mL / NET: 1800 mL    RADIOLOGY & ADDITIONAL TESTS:  Radiology testing independently reviewed:   < from: CT Abdomen and Pelvis w/ Oral Cont and w/ IV Cont (08.19.17 @ 11:25) >  Interpretation: Liver: No focal lesions.  There is fluid near the gastric   pylorus and proximal duodenum of uncertain significance. The portal vein   is patent.    Biliary tree: Not dilated.      Solid organs: unremarkable.      Retroperitoneum: No evidence of lymphadenopathy.      Bowel: No evidence of bowel obstruction or free air.    Appendix: Thickened to 9 mm with adjacent fluid or inflammation   indicating acute appendicitis    Urinary bladder: Fills with smooth margins.      Pelvis: Shows a small left adnexal cyst and a small to moderate amount of   free pelvic fluid.      The inguinal regions are unremarkable.      The lung bases are clear.      IMPRESSION: Acute appendicitis with areas of fluid as described.    < end of copied text >    Consultant(s) Notes Reviewed:  [ x] YES  [ ] NO    Care Discussed with Consultants/Other Providers [ x] YES  [ ] NO

## 2017-08-19 NOTE — CONSULT NOTE ADULT - SUBJECTIVE AND OBJECTIVE BOX
HPI:  21 Y/O F, PMhx of gastritis and PUD in last year ( treated with meds), lives with family, came for many episodes of vomiting nausea, heart burn, abdominal pain. As per the patient she started having vomiting today at about 4 :00 am Friday. Initially vomiting was containing food particles, later it was watery with green colour, no blood. Upto now she has many episodes of vomiting. Associated she has cramping periumbilical abdominal pain, 5/10. She aslo endorses having the loose stools about 3 episodes yesterday she ate at [employer] , but it is not new for her. She has similar kind of episode about last year in Feb, seen by gastroenterologist Dr Valentine , given the treatment with some meds for 10 days, diagnosed with ulcer, then she used the omeprazole for about 3 months wit resolution in her symptoms. No f/up afterwards. She denies any headache weight loss, urinary complains, blood in stool,  no recent use of antibiotics.     RUQ sono was done yesterday read as neg  CT a/p was done this am read as Appendix: Thickened to 9 mm with adjacent fluid or inflammation   indicating acute appendicitis      Last LMP this august, she currently having her periods (18 Aug 2017 18:47)      PAST MEDICAL & SURGICAL HISTORY:  PUD (peptic ulcer disease)  Ovarian cyst  Gastritis  Gastritis  No significant past surgical history  No significant past surgical history      Vital Signs Last 24 Hrs  T(C): 38 (19 Aug 2017 11:08), Max: 38 (19 Aug 2017 11:08)  T(F): 100.4 (19 Aug 2017 11:08), Max: 100.4 (19 Aug 2017 11:08)  HR: 127 (19 Aug 2017 10:02) (59 - 127)  BP: 118/85 (19 Aug 2017 10:02) (94/59 - 118/85)  BP(mean): --  RR: 20 (19 Aug 2017 10:02) (14 - 20)  SpO2: 100% (19 Aug 2017 10:02) (98% - 100%)                          11.8   10.4  )-----------( 160      ( 19 Aug 2017 07:04 )             35.8     08-19    141  |  108  |  6<L>  ----------------------------<  85  3.3<L>   |  25  |  0.65    Ca    8.3<L>      19 Aug 2017 07:04  Phos  2.5     08-19  Mg     1.9     08-19    TPro  6.7  /  Alb  3.2<L>  /  TBili  1.3<H>  /  DBili  x   /  AST  12  /  ALT  14  /  AlkPhos  50  08-19        PHYSICAL EXAM  Abdomen: soft, tender RLQ, +rebound, no guarding    ASSESSMENT/ PLAN: npo, ivf, iv abx, pre-op for lap appy poss open this afternoon HPI:  23 Y/O F, PMhx of gastritis and PUD in last year ( treated with meds), lives with family, came for many episodes of vomiting nausea, heart burn, abdominal pain. As per the patient she started having vomiting today at about 4 :00 am Friday. Initially vomiting was containing food particles, later it was watery with green colour, no blood. Upto now she has many episodes of vomiting. Associated she has cramping periumbilical abdominal pain, 5/10. She aslo endorses having the loose stools about 3 episodes yesterday she ate at [employer] , but it is not new for her. She has similar kind of episode about last year in Feb, seen by gastroenterologist Dr Valentine , given the treatment with some meds for 10 days, diagnosed with ulcer, then she used the omeprazole for about 3 months wit resolution in her symptoms. No f/up afterwards. She denies any headache weight loss, urinary complains, blood in stool,  no recent use of antibiotics.     RUQ sono was done yesterday read as neg  CT a/p was done this am read as Appendix: Thickened to 9 mm with adjacent fluid or inflammation   indicating acute appendicitis      Last LMP this august, she currently having her periods (18 Aug 2017 18:47)      PAST MEDICAL & SURGICAL HISTORY:  PUD (peptic ulcer disease)  Ovarian cyst  Gastritis  Gastritis  No significant past surgical history  No significant past surgical history      Vital Signs Last 24 Hrs  T(C): 38 (19 Aug 2017 11:08), Max: 38 (19 Aug 2017 11:08)  T(F): 100.4 (19 Aug 2017 11:08), Max: 100.4 (19 Aug 2017 11:08)  HR: 127 (19 Aug 2017 10:02) (59 - 127)  BP: 118/85 (19 Aug 2017 10:02) (94/59 - 118/85)  BP(mean): --  RR: 20 (19 Aug 2017 10:02) (14 - 20)  SpO2: 100% (19 Aug 2017 10:02) (98% - 100%)                          11.8   10.4  )-----------( 160      ( 19 Aug 2017 07:04 )             35.8     08-19    141  |  108  |  6<L>  ----------------------------<  85  3.3<L>   |  25  |  0.65    Ca    8.3<L>      19 Aug 2017 07:04  Phos  2.5     08-19  Mg     1.9     08-19    TPro  6.7  /  Alb  3.2<L>  /  TBili  1.3<H>  /  DBili  x   /  AST  12  /  ALT  14  /  AlkPhos  50  08-19        PHYSICAL EXAM  Abdomen: soft, tender RLQ, +rebound, no guarding    ASSESSMENT/ PLAN: npo, ivf, iv abx, pre-op for lap appy poss open this afternoon  request by Dr Bridges for NGT as pt stomach distended on CT with risk of aspiration; pt agreeable and NGT placed with about 100 cc output

## 2017-08-19 NOTE — PROGRESS NOTE ADULT - SUBJECTIVE AND OBJECTIVE BOX
Pt s/p lap appendectomy.  Pt c/o mild incisional pain  Denies N/V, fever/chills  Tolerating CLD  +voiding    PE: comfortable, NAD    Vital Signs Last 24 Hrs  T(C): 37 (19 Aug 2017 18:30), Max: 38 (19 Aug 2017 11:08)  T(F): 98.6 (19 Aug 2017 18:30), Max: 100.4 (19 Aug 2017 11:08)  HR: 95 (19 Aug 2017 18:30) (67 - 127)  BP: 128/67 (19 Aug 2017 18:30) (92/57 - 128/67)  BP(mean): --  RR: 17 (19 Aug 2017 18:30) (15 - 22)  SpO2: 100% (19 Aug 2017 18:30) (98% - 100%)    Chest: CTA B/L  CVS: S1S2, RRR  Abd: soft, ND, hypoactive BS, dressing C/D/I               I&O's Detail    18 Aug 2017 07:01  -  19 Aug 2017 07:00  --------------------------------------------------------  IN:    sodium chloride 0.9%: 1800 mL  Total IN: 1800 mL    OUT:  Total OUT: 0 mL    Total NET: 1800 mL      MEDICATIONS  (STANDING):  heparin  Injectable 5000 Unit(s) SubCutaneous every 8 hours  sodium chloride 0.9%. 1000 milliLiter(s) (115 mL/Hr) IV Continuous <Continuous>  metroNIDAZOLE  IVPB 500 milliGRAM(s) IV Intermittent every 8 hours  ciprofloxacin   IVPB 400 milliGRAM(s) IV Intermittent every 12 hours    MEDICATIONS  (PRN):  acetaminophen   Tablet 650 milliGRAM(s) Oral every 6 hours PRN For Temp greater than 38 C (100.4 F)  oxyCODONE    5 mG/acetaminophen 325 mG 1 Tablet(s) Oral every 4 hours PRN Moderate Pain  morphine  - Injectable 2 milliGRAM(s) IV Push every 6 hours PRN Severe Pain  ondansetron Injectable 4 milliGRAM(s) IV Push every 6 hours PRN Nausea

## 2017-08-20 ENCOUNTER — TRANSCRIPTION ENCOUNTER (OUTPATIENT)
Age: 22
End: 2017-08-20

## 2017-08-20 VITALS
DIASTOLIC BLOOD PRESSURE: 62 MMHG | RESPIRATION RATE: 16 BRPM | HEART RATE: 75 BPM | TEMPERATURE: 98 F | OXYGEN SATURATION: 100 % | SYSTOLIC BLOOD PRESSURE: 101 MMHG

## 2017-08-20 LAB
24R-OH-CALCIDIOL SERPL-MCNC: 14.7 NG/ML — LOW (ref 30–100)
ANION GAP SERPL CALC-SCNC: 9 MMOL/L — SIGNIFICANT CHANGE UP (ref 5–17)
BASOPHILS # BLD AUTO: 0 K/UL — SIGNIFICANT CHANGE UP (ref 0–0.2)
BASOPHILS NFR BLD AUTO: 0.2 % — SIGNIFICANT CHANGE UP (ref 0–2)
BUN SERPL-MCNC: 7 MG/DL — SIGNIFICANT CHANGE UP (ref 7–18)
CALCIUM SERPL-MCNC: 8 MG/DL — LOW (ref 8.4–10.5)
CHLORIDE SERPL-SCNC: 109 MMOL/L — HIGH (ref 96–108)
CO2 SERPL-SCNC: 25 MMOL/L — SIGNIFICANT CHANGE UP (ref 22–31)
CREAT SERPL-MCNC: 0.54 MG/DL — SIGNIFICANT CHANGE UP (ref 0.5–1.3)
EOSINOPHIL # BLD AUTO: 0 K/UL — SIGNIFICANT CHANGE UP (ref 0–0.5)
EOSINOPHIL NFR BLD AUTO: 0 % — SIGNIFICANT CHANGE UP (ref 0–6)
GLUCOSE SERPL-MCNC: 98 MG/DL — SIGNIFICANT CHANGE UP (ref 70–99)
HCT VFR BLD CALC: 31.9 % — LOW (ref 34.5–45)
HGB BLD-MCNC: 11.4 G/DL — LOW (ref 11.5–15.5)
LYMPHOCYTES # BLD AUTO: 0.6 K/UL — LOW (ref 1–3.3)
LYMPHOCYTES # BLD AUTO: 5.2 % — LOW (ref 13–44)
MAGNESIUM SERPL-MCNC: 2 MG/DL — SIGNIFICANT CHANGE UP (ref 1.6–2.6)
MCHC RBC-ENTMCNC: 31.7 PG — SIGNIFICANT CHANGE UP (ref 27–34)
MCHC RBC-ENTMCNC: 35.6 GM/DL — SIGNIFICANT CHANGE UP (ref 32–36)
MCV RBC AUTO: 89 FL — SIGNIFICANT CHANGE UP (ref 80–100)
MONOCYTES # BLD AUTO: 1 K/UL — HIGH (ref 0–0.9)
MONOCYTES NFR BLD AUTO: 8.1 % — SIGNIFICANT CHANGE UP (ref 2–14)
NEUTROPHILS # BLD AUTO: 10.3 K/UL — HIGH (ref 1.8–7.4)
NEUTROPHILS NFR BLD AUTO: 86.4 % — HIGH (ref 43–77)
PHOSPHATE SERPL-MCNC: 2.3 MG/DL — LOW (ref 2.5–4.5)
PLATELET # BLD AUTO: 105 K/UL — LOW (ref 150–400)
POTASSIUM SERPL-MCNC: 3.9 MMOL/L — SIGNIFICANT CHANGE UP (ref 3.5–5.3)
POTASSIUM SERPL-SCNC: 3.9 MMOL/L — SIGNIFICANT CHANGE UP (ref 3.5–5.3)
RBC # BLD: 3.58 M/UL — LOW (ref 3.8–5.2)
RBC # FLD: 13 % — SIGNIFICANT CHANGE UP (ref 10.3–14.5)
SODIUM SERPL-SCNC: 143 MMOL/L — SIGNIFICANT CHANGE UP (ref 135–145)
WBC # BLD: 12 K/UL — HIGH (ref 3.8–10.5)
WBC # FLD AUTO: 12 K/UL — HIGH (ref 3.8–10.5)

## 2017-08-20 PROCEDURE — 84443 ASSAY THYROID STIM HORMONE: CPT

## 2017-08-20 PROCEDURE — C1889: CPT

## 2017-08-20 PROCEDURE — 80061 LIPID PANEL: CPT

## 2017-08-20 PROCEDURE — 83690 ASSAY OF LIPASE: CPT

## 2017-08-20 PROCEDURE — 80053 COMPREHEN METABOLIC PANEL: CPT

## 2017-08-20 PROCEDURE — 76705 ECHO EXAM OF ABDOMEN: CPT

## 2017-08-20 PROCEDURE — 99285 EMERGENCY DEPT VISIT HI MDM: CPT | Mod: 25

## 2017-08-20 PROCEDURE — 85610 PROTHROMBIN TIME: CPT

## 2017-08-20 PROCEDURE — 85027 COMPLETE CBC AUTOMATED: CPT

## 2017-08-20 PROCEDURE — 36415 COLL VENOUS BLD VENIPUNCTURE: CPT

## 2017-08-20 PROCEDURE — 82306 VITAMIN D 25 HYDROXY: CPT

## 2017-08-20 PROCEDURE — 83735 ASSAY OF MAGNESIUM: CPT

## 2017-08-20 PROCEDURE — 80048 BASIC METABOLIC PNL TOTAL CA: CPT

## 2017-08-20 PROCEDURE — 74177 CT ABD & PELVIS W/CONTRAST: CPT

## 2017-08-20 PROCEDURE — 82607 VITAMIN B-12: CPT

## 2017-08-20 PROCEDURE — 84100 ASSAY OF PHOSPHORUS: CPT

## 2017-08-20 PROCEDURE — 82746 ASSAY OF FOLIC ACID SERUM: CPT

## 2017-08-20 PROCEDURE — 88304 TISSUE EXAM BY PATHOLOGIST: CPT

## 2017-08-20 PROCEDURE — 81025 URINE PREGNANCY TEST: CPT

## 2017-08-20 PROCEDURE — 83036 HEMOGLOBIN GLYCOSYLATED A1C: CPT

## 2017-08-20 RX ORDER — MOXIFLOXACIN HYDROCHLORIDE TABLETS, 400 MG 400 MG/1
1 TABLET, FILM COATED ORAL
Qty: 14 | Refills: 0 | OUTPATIENT
Start: 2017-08-20 | End: 2017-08-27

## 2017-08-20 RX ORDER — PANTOPRAZOLE SODIUM 20 MG/1
40 TABLET, DELAYED RELEASE ORAL
Qty: 0 | Refills: 0 | Status: DISCONTINUED | OUTPATIENT
Start: 2017-08-20 | End: 2017-08-20

## 2017-08-20 RX ORDER — PANTOPRAZOLE SODIUM 20 MG/1
40 TABLET, DELAYED RELEASE ORAL ONCE
Qty: 0 | Refills: 0 | Status: COMPLETED | OUTPATIENT
Start: 2017-08-20 | End: 2017-08-20

## 2017-08-20 RX ORDER — OXYCODONE HYDROCHLORIDE 5 MG/1
1 TABLET ORAL
Qty: 16 | Refills: 0 | OUTPATIENT
Start: 2017-08-20 | End: 2017-08-24

## 2017-08-20 RX ORDER — METRONIDAZOLE 500 MG
1 TABLET ORAL
Qty: 21 | Refills: 0 | OUTPATIENT
Start: 2017-08-20 | End: 2017-08-27

## 2017-08-20 RX ADMIN — OXYCODONE AND ACETAMINOPHEN 1 TABLET(S): 5; 325 TABLET ORAL at 05:41

## 2017-08-20 RX ADMIN — OXYCODONE AND ACETAMINOPHEN 1 TABLET(S): 5; 325 TABLET ORAL at 06:45

## 2017-08-20 RX ADMIN — HEPARIN SODIUM 5000 UNIT(S): 5000 INJECTION INTRAVENOUS; SUBCUTANEOUS at 05:42

## 2017-08-20 RX ADMIN — HEPARIN SODIUM 5000 UNIT(S): 5000 INJECTION INTRAVENOUS; SUBCUTANEOUS at 14:02

## 2017-08-20 RX ADMIN — OXYCODONE AND ACETAMINOPHEN 1 TABLET(S): 5; 325 TABLET ORAL at 11:05

## 2017-08-20 RX ADMIN — OXYCODONE AND ACETAMINOPHEN 1 TABLET(S): 5; 325 TABLET ORAL at 11:35

## 2017-08-20 RX ADMIN — Medication 100 MILLIGRAM(S): at 14:01

## 2017-08-20 RX ADMIN — PANTOPRAZOLE SODIUM 40 MILLIGRAM(S): 20 TABLET, DELAYED RELEASE ORAL at 11:07

## 2017-08-20 RX ADMIN — Medication 100 MILLIGRAM(S): at 05:50

## 2017-08-20 RX ADMIN — Medication 200 MILLIGRAM(S): at 10:00

## 2017-08-20 NOTE — DISCHARGE NOTE ADULT - MEDICATION SUMMARY - MEDICATIONS TO TAKE
I will START or STAY ON the medications listed below when I get home from the hospital:    birth control  --     -- Indication: For As previously prescribed    metroNIDAZOLE 500 mg oral tablet  -- 1 tab(s) by mouth every 8 hours  -- Do not drink alcoholic beverages when taking this medication.  Finish all this medication unless otherwise directed by prescriber.  May discolor urine or feces.    -- Indication: For Acute appendicitis    acetaminophen-oxycodone 325 mg-5 mg oral tablet  -- 1 tab(s) by mouth every 6 hours, As Needed, Moderate Pain MDD:4 tablets  -- Indication: For Acute appendicitis    ibuprofen 400 mg oral tablet  -- 1 tab(s) by mouth every 4 hours, As Needed  -- Indication: For As previously prescribed    ibuprofen 600 mg oral tablet  -- 1 tab(s) by mouth 4 times a day  -- Indication: For As previously prescribed    ondansetron 4 mg oral tablet  -- 1 tab(s) by mouth every 8 hours, As Needed for nausea  -- Indication: For As previously prescribed    famotidine 20 mg oral tablet  -- 1 tab(s) by mouth 2 times a day  -- It is very important that you take or use this exactly as directed.  Do not skip doses or discontinue unless directed by your doctor.  Obtain medical advice before taking any non-prescription drugs as some may affect the action of this medication.    -- Indication: For As previously prescribed    Pepcid 40 mg oral tablet  -- 1 tab(s) by mouth once a day  -- It is very important that you take or use this exactly as directed.  Do not skip doses or discontinue unless directed by your doctor.  Obtain medical advice before taking any non-prescription drugs as some may affect the action of this medication.    -- Indication: For As previously prescribed    Carafate 1 g/10 mL oral suspension  -- 10 milliliter(s) by mouth 4 times a day (before meals and at bedtime)  -- Do not take dairy products, antacids, or iron preparations within one hour of this medication.  It is very important that you take or use this exactly as directed.  Do not skip doses or discontinue unless directed by your doctor.  Shake well before use.  Take medication on an empty stomach 1 hour before or 2 to 3 hours after a meal unless otherwise directed by your doctor.    -- Indication: For As previously prescribed    omeprazole 20 mg oral delayed release capsule  -- 1 cap(s) by mouth once a day in the morning 30 mins before food intake for acid reflux  -- It is very important that you take or use this exactly as directed.  Do not skip doses or discontinue unless directed by your doctor.  Obtain medical advice before taking any non-prescription drugs as some may affect the action of this medication.  Swallow whole.  Do not crush.    -- Indication: For As previously prescribed    omeprazole  --  by mouth   -- Indication: For As previously prescribed    Cipro 500 mg oral tablet  -- 1 tab(s) by mouth 2 times a day  -- Avoid prolonged or excessive exposure to direct and/or artificial sunlight while taking this medication.  Check with your doctor before becoming pregnant.  Do not take dairy products, antacids, or iron preparations within one hour of this medication.  Finish all this medication unless otherwise directed by prescriber.  Medication should be taken with plenty of water.    -- Indication: For Acute appendicitis

## 2017-08-20 NOTE — DISCHARGE NOTE ADULT - CARE PROVIDER_API CALL
Tony Bridges (MD), Surgery  [address] [facility]  [address]  [place] [address]  Phone: [phone]  Fax: [phone]

## 2017-08-20 NOTE — PROGRESS NOTE ADULT - PROBLEM SELECTOR PLAN 3
Improve score is 0, no need for DVT prophylaxis, encourage the ambulation.
demar with GYN outpatient
demar with GYN outpatient

## 2017-08-20 NOTE — PROGRESS NOTE ADULT - PROBLEM SELECTOR PLAN 2
S/p cyst removal about 2 yrs ago
continue with PPI,  f.u with GI outpatient
continue with PPI,  pain meds as needed, feel percocet or morphine is enough  DC dilaudid

## 2017-08-20 NOTE — DISCHARGE NOTE ADULT - OTHER SIGNIFICANT FINDINGS
< from: CT Abdomen and Pelvis w/ Oral Cont and w/ IV Cont (08.19.17 @ 11:25) >    EXAM:  CT ABDOMEN AND PELVIS OC IC                            PROCEDURE DATE:  08/19/2017          INTERPRETATION:  CT of the Abdomen with contrast and CT of the Pelvis   with contrast    HISTORY: Nausea, vomiting and periumbilical pain    Comparison: 8/1/2016    TECHNIQUE: Multiple axial scans of the abdomen and pelvis were obtained   after administration of IV and bowel contrast material.      Interpretation: Liver: No focal lesions.  There is fluid near the gastric   pylorus and proximal duodenum of uncertain significance. The portal vein   is patent.    Biliary tree: Not dilated.      Solid organs: unremarkable.      Retroperitoneum: No evidence of lymphadenopathy.      Bowel: No evidence of bowel obstruction or free air.    Appendix: Thickened to 9 mm with adjacent fluid or inflammation   indicating acute appendicitis    Urinary bladder: Fills with smooth margins.      Pelvis: Shows a small left adnexal cyst and a small to moderate amount of   free pelvic fluid.      The inguinal regions are unremarkable.      The lung bases are clear.      IMPRESSION: Acute appendicitis with areas of fluid as described.    The above findings were conveyed by telephone to Dr. Baez at 11:58 AM   on the morning of the examination.    Thank you for  this referral.                ELLIOTT ESCOBEDO M.D., ATTENDING RADIOLOGIST  This document has been electronically signed. Aug 19 2017 12:04PM                < end of copied text >

## 2017-08-20 NOTE — DISCHARGE NOTE ADULT - PATIENT PORTAL LINK FT
“You can access the [facility] Patient Portal, offered by [facility], by registering with the following website: [email or web address]”

## 2017-08-20 NOTE — PROGRESS NOTE ADULT - SUBJECTIVE AND OBJECTIVE BOX
Patient is a 22y old  Female who presents with a chief complaint of vomiting, heart burn (20 Aug 2017 12:04)      INTERVAL HPI/OVERNIGHT EVENTS:  patient was seen in Am, supposed to be transferred under Surgery service.  S/P appendicectomy yesterday, still has mild soreness in the abdomen, but tolerating oral diet.     Allergies    peanuts (Anaphylaxis; Angioedema)  penicillin (Pruritus; Hives)    Intolerances    REVIEW OF SYSTEMS:  CONSTITUTIONAL: No fever, weight loss, or fatigue  EYES: No eye pain, visual disturbances, or discharge  RESPIRATORY: No cough, wheezing or shortness of breath  CARDIOVASCULAR: No chest pain, feeling of heart beats  GASTROINTESTINAL: No abdominal or epigastric pain. No nausea, vomiting; No diarrhea or constipation.  GENITOURINARY: No dysuria, frequency, hematuria  NEUROLOGICAL: No headaches  MUSCULOSKELETAL: No joint pain  PSYCHIATRIC: No depression or anxiety  HEME/LYMPH: No easy bruising, or bleeding gums  ALLERGY AND IMMUNOLOGIC: No hives or eczema    Medications:  heparin  Injectable 5000 Unit(s) SubCutaneous every 8 hours  sodium chloride 0.9%. 1000 milliLiter(s) IV Continuous <Continuous>  acetaminophen   Tablet 650 milliGRAM(s) Oral every 6 hours PRN For Temp greater than 38 C (100.4 F)  oxyCODONE    5 mG/acetaminophen 325 mG 1 Tablet(s) Oral every 4 hours PRN Moderate Pain  morphine  - Injectable 2 milliGRAM(s) IV Push every 6 hours PRN Severe Pain  ondansetron Injectable 4 milliGRAM(s) IV Push every 6 hours PRN Nausea  metroNIDAZOLE  IVPB 500 milliGRAM(s) IV Intermittent every 8 hours  ciprofloxacin   IVPB 400 milliGRAM(s) IV Intermittent every 12 hours  pantoprazole    Tablet 40 milliGRAM(s) Oral before breakfast      PHYSICAL EXAM:  Vital Signs Last 24 Hrs  T(C): 36.9 (20 Aug 2017 13:04), Max: 37.1 (19 Aug 2017 21:18)  T(F): 98.5 (20 Aug 2017 13:04), Max: 98.7 (19 Aug 2017 21:18)  HR: 75 (20 Aug 2017 13:04) (69 - 95)  BP: 101/62 (20 Aug 2017 13:04) (95/53 - 128/67)  BP(mean): --  RR: 16 (20 Aug 2017 13:04) (16 - 20)  SpO2: 100% (20 Aug 2017 13:04) (99% - 100%)    GENERAL: NAD    LABS:                        11.4   12.0  )-----------( 105      ( 20 Aug 2017 06:32 )             31.9     08-20    143  |  109<H>  |  7   ----------------------------<  98  3.9   |  25  |  0.54    Ca    8.0<L>      20 Aug 2017 06:32  Phos  2.3     08-20  Mg     2.0     08-20    TPro  6.7  /  Alb  3.2<L>  /  TBili  1.3<H>  /  DBili  x   /  AST  12  /  ALT  14  /  AlkPhos  50  08-19    LIVER FUNCTIONS - ( 19 Aug 2017 07:04 )  Alb: 3.2 g/dL / Pro: 6.7 g/dL / ALK PHOS: 50 U/L / ALT: 14 U/L DA / AST: 12 U/L / GGT: x             PT/INR - ( 19 Aug 2017 14:24 )   PT: 14.5 sec;   INR: 1.32 ratio      Culture & Sensitivity:   CAPILLARY BLOOD GLUCOSE    RADIOLOGY & ADDITIONAL TESTS:  Radiology testing independently reviewed:   < from: CT Abdomen and Pelvis w/ Oral Cont and w/ IV Cont (08.19.17 @ 11:25) >  IMPRESSION: Acute appendicitis with areas of fluid as described.    The above findings were conveyed by telephone to Dr. Baez at 11:58 AM   on the morning of the examination.    Thank you for  this referral.    < end of copied text >    Consultant(s) Notes Reviewed:  [ x] YES  [ ] NO    Care Discussed with Consultants/Other Providers [ x] YES  [ ] NO

## 2017-08-20 NOTE — DISCHARGE NOTE ADULT - CARE PLAN
Principal Discharge DX:	Acute appendicitis with localized peritonitis  Goal:	resume diet and nl activities  Instructions for follow-up, activity and diet:	f/u with Dr. Bridges in 1 week, Ambulate as tolerated. Eat a healthy well balanced diet.

## 2017-08-20 NOTE — PROGRESS NOTE ADULT - PROBLEM SELECTOR PROBLEM 2
Acute gastritis without hemorrhage, unspecified gastritis type
Ovarian cyst
Acute gastritis without hemorrhage, unspecified gastritis type

## 2017-08-20 NOTE — PROGRESS NOTE ADULT - SUBJECTIVE AND OBJECTIVE BOX
SUBJECTIVE    reports pain she came with is gone  Nausea: [ ] YES [X ] NO           Vomiting: [ ] YES [X ] NO  Flatus: [ ] YES [X] NO             Bowel Movement: [ ] YES [X ] NO       Voiding normally: [X ]YES [ ]NO  Pain under control: [X ] YES [ ] NO  clears  Ambulated: [X ] YES [ ]NO  Using Incentive Spirometer: [X ] YES [ ] NO    VITALS  Vital Signs Last 24 Hrs  T(C): 36.8 (20 Aug 2017 06:00), Max: 38 (19 Aug 2017 11:08)  T(F): 98.2 (20 Aug 2017 06:00), Max: 100.4 (19 Aug 2017 11:08)  HR: 69 (20 Aug 2017 06:00) (69 - 127)  BP: 95/58 (20 Aug 2017 06:00) (92/57 - 128/67)  BP(mean): --  RR: 18 (20 Aug 2017 06:00) (16 - 22)  SpO2: 100% (20 Aug 2017 06:00) (99% - 100%)    I&O's Summary      PHYSICAL EXAMINATION    Abdomen: soft, mildly distended, appropriately and minimally tender along incisions      LABS                        11.4   12.0  )-----------( 105      ( 20 Aug 2017 06:32 )             31.9     08-20    143  |  109<H>  |  7   ----------------------------<  98  3.9   |  25  |  0.54    Ca    8.0<L>      20 Aug 2017 06:32  Phos  2.3     08-20  Mg     2.0     08-20    TPro  6.7  /  Alb  3.2<L>  /  TBili  1.3<H>  /  DBili  x   /  AST  12  /  ALT  14  /  AlkPhos  50  08-19     LIVER FUNCTIONS - ( 19 Aug 2017 07:04 )  Alb: 3.2 g/dL / Pro: 6.7 g/dL / ALK PHOS: 50 U/L / ALT: 14 U/L DA / AST: 12 U/L / GGT: x             MEDICATIONS  MEDICATIONS  (STANDING):  heparin  Injectable 5000 Unit(s) SubCutaneous every 8 hours  sodium chloride 0.9%. 1000 milliLiter(s) (115 mL/Hr) IV Continuous <Continuous>  metroNIDAZOLE  IVPB 500 milliGRAM(s) IV Intermittent every 8 hours  ciprofloxacin   IVPB 400 milliGRAM(s) IV Intermittent every 12 hours    MEDICATIONS  (PRN):  acetaminophen   Tablet 650 milliGRAM(s) Oral every 6 hours PRN For Temp greater than 38 C (100.4 F)  oxyCODONE    5 mG/acetaminophen 325 mG 1 Tablet(s) Oral every 4 hours PRN Moderate Pain  morphine  - Injectable 2 milliGRAM(s) IV Push every 6 hours PRN Severe Pain  ondansetron Injectable 4 milliGRAM(s) IV Push every 6 hours PRN Nausea

## 2017-08-20 NOTE — DISCHARGE NOTE ADULT - HOSPITAL COURSE
21 Y/O F, PMhx of gastritis and PUD in last year ( treated with meds), lives with family, came for many episodes of vomiting nausea, heart burn, abdominal pain. As per the patient she started having vomiting today at about 4 :00 am. Initially vomiting was containing food particles, later it was watery with green colour, no blood. Upto now she has many episodes of vomiting. Associated she has cramping periumbilical abdominal pain, 5/10. She aslo endorses having the loose stools about 3 episodes yesterday she ate at [employer] , but it is not new for her. She has similar kind of episode about last year in Feb, seen by gastroenterologist Dr Valentine , given the treatment with some meds for 10 days, diagnosed with ulcer, then she used the omeprazole for about 3 months wit resolution in her symptoms. No f/up afterwards. She denies any headache weight loss, urinary complains, blood in stool,  no recent use of antibiotics.     Last LMP this august, she currently having her periods (18 Aug 2017 18:47)      Pt complained of lower abdominal pain for which acute appendicitis was suspected.  CT scan abdomen showed acute appendicitis.  Pt underwent laparoscopic appendectomy. Postoperative course was uncomplicated. Pt is stable for discharge home with oral antibiotics.  Pt tolerated regular diet.

## 2017-08-20 NOTE — DISCHARGE NOTE ADULT - PLAN OF CARE
resume diet and nl activities f/u with Dr. Bridges in 1 week, Ambulate as tolerated. Eat a healthy well balanced diet.

## 2017-08-20 NOTE — PROGRESS NOTE ADULT - PROBLEM SELECTOR PLAN 1
Nausea, vomiting, pete umbilical pain shifted to right lower quadrant   CT angio with oral contrast showed evidence of acute appendicitis   surgery consulted  NPO for surgery  started on cipro/flagyl as patient allergic to penicillin
s/p surgery, tolerating oral diet
-OOB, IS  -CLD; advance as tolerated in AM  -Cont abx  -Pain management  -GI/DVT prophylaxis
Surgery input appreciated,  NPO for surgery  Patient is low risk for a low risk procedure and may proceed without any further testing.

## 2017-08-20 NOTE — PROGRESS NOTE ADULT - ASSESSMENT
22F with acute appendicitis, s/p lap appy. Stable, afebrile.    1) advance diet  2) if tolerates and pain controlled, D/C on cipro flagyl x 7 days

## 2017-08-20 NOTE — PROGRESS NOTE ADULT - PROBLEM SELECTOR PROBLEM 1
Acute appendicitis
Acute appendicitis, unspecified acute appendicitis type
Acute appendicitis
Acute appendicitis, unspecified acute appendicitis type

## 2017-08-21 ENCOUNTER — TRANSCRIPTION ENCOUNTER (OUTPATIENT)
Age: 22
End: 2017-08-21

## 2017-08-22 DIAGNOSIS — Z98.890 OTHER SPECIFIED POSTPROCEDURAL STATES: ICD-10-CM

## 2017-08-22 DIAGNOSIS — K35.3 ACUTE APPENDICITIS WITH LOCALIZED PERITONITIS: ICD-10-CM

## 2017-08-22 DIAGNOSIS — K29.00 ACUTE GASTRITIS WITHOUT BLEEDING: ICD-10-CM

## 2017-08-22 DIAGNOSIS — K31.89 OTHER DISEASES OF STOMACH AND DUODENUM: ICD-10-CM

## 2017-08-22 DIAGNOSIS — Z91.010 ALLERGY TO PEANUTS: ICD-10-CM

## 2017-08-22 DIAGNOSIS — Z88.0 ALLERGY STATUS TO PENICILLIN: ICD-10-CM

## 2017-08-23 PROBLEM — K27.9 PEPTIC ULCER, SITE UNSPECIFIED, UNSPECIFIED AS ACUTE OR CHRONIC, WITHOUT HEMORRHAGE OR PERFORATION: Chronic | Status: ACTIVE | Noted: 2017-08-18

## 2017-08-30 ENCOUNTER — APPOINTMENT (OUTPATIENT)
Dept: SURGERY | Facility: CLINIC | Age: 22
End: 2017-08-30
Payer: SELF-PAY

## 2017-08-30 VITALS
SYSTOLIC BLOOD PRESSURE: 103 MMHG | HEART RATE: 77 BPM | TEMPERATURE: 97.8 F | OXYGEN SATURATION: 100 % | WEIGHT: 113.5 LBS | DIASTOLIC BLOOD PRESSURE: 71 MMHG | BODY MASS INDEX: 20.89 KG/M2 | HEIGHT: 62 IN

## 2017-08-30 DIAGNOSIS — Z83.3 FAMILY HISTORY OF DIABETES MELLITUS: ICD-10-CM

## 2017-08-30 DIAGNOSIS — Z82.49 FAMILY HISTORY OF ISCHEMIC HEART DISEASE AND OTHER DISEASES OF THE CIRCULATORY SYSTEM: ICD-10-CM

## 2017-08-30 DIAGNOSIS — K35.3 ACUTE APPENDICITIS WITH LOCALIZED PERITONITIS: ICD-10-CM

## 2017-08-30 DIAGNOSIS — Z78.9 OTHER SPECIFIED HEALTH STATUS: ICD-10-CM

## 2017-08-30 PROBLEM — Z00.00 ENCOUNTER FOR PREVENTIVE HEALTH EXAMINATION: Status: ACTIVE | Noted: 2017-08-30

## 2017-08-30 PROCEDURE — 99024 POSTOP FOLLOW-UP VISIT: CPT

## 2017-08-30 RX ORDER — CIPROFLOXACIN HYDROCHLORIDE 500 MG/1
TABLET, FILM COATED ORAL
Refills: 0 | Status: DISCONTINUED | COMMUNITY

## 2017-08-30 RX ORDER — OMEPRAZOLE, SODIUM BICARBONATE 20; 1100 MG/1; MG/1
20-1100 CAPSULE ORAL
Refills: 0 | Status: DISCONTINUED | COMMUNITY

## 2017-08-30 RX ORDER — METRONIDAZOLE 500 MG/1
500 TABLET ORAL
Qty: 14 | Refills: 0 | Status: DISCONTINUED | COMMUNITY
Start: 2017-05-07

## 2018-04-25 NOTE — DISCHARGE NOTE ADULT - NS AS ACTIVITY OBS
[___ inches] : [unfilled] inches [FreeTextEntry5] : 15yo [FreeTextEntry2] : 5yo sister- in good health No Heavy lifting/straining/3 weeks

## 2023-01-05 NOTE — PATIENT PROFILE ADULT. - MEDICATIONS BROUGHT TO HOSPITAL, PROFILE
Asymptomatic, not on any anti-coagulation, no changes, continue to monitor
Stable, fluctuating, medications recently adjusted, seeing specialist, no indication to make any changes
Stable, monitored by specialist. No acute findings meriting change in the plan.   Records/labs will be requested
no

## 2024-03-07 NOTE — PATIENT PROFILE ADULT. - CAREGIVER
----- Message from Keri Johnston MD sent at 3/7/2024 12:29 PM CST -----  Tsh is elevated would start levothyoxine 0.25 mg daily marcial tsh reflex in 2 mo or so   No